# Patient Record
Sex: FEMALE | Race: WHITE | NOT HISPANIC OR LATINO
[De-identification: names, ages, dates, MRNs, and addresses within clinical notes are randomized per-mention and may not be internally consistent; named-entity substitution may affect disease eponyms.]

---

## 2017-05-02 ENCOUNTER — RX RENEWAL (OUTPATIENT)
Age: 67
End: 2017-05-02

## 2017-05-15 ENCOUNTER — APPOINTMENT (OUTPATIENT)
Dept: NEUROSURGERY | Facility: CLINIC | Age: 67
End: 2017-05-15

## 2017-05-15 VITALS
HEART RATE: 72 BPM | HEIGHT: 68 IN | WEIGHT: 130 LBS | DIASTOLIC BLOOD PRESSURE: 96 MMHG | SYSTOLIC BLOOD PRESSURE: 190 MMHG | BODY MASS INDEX: 19.7 KG/M2 | OXYGEN SATURATION: 98 %

## 2017-05-15 DIAGNOSIS — G50.0 TRIGEMINAL NEURALGIA: ICD-10-CM

## 2017-05-15 DIAGNOSIS — R41.3 OTHER AMNESIA: ICD-10-CM

## 2017-05-15 RX ORDER — CARBAMAZEPINE 200 MG/1
200 TABLET, EXTENDED RELEASE ORAL
Qty: 60 | Refills: 0 | Status: ACTIVE | COMMUNITY
Start: 2017-05-15

## 2017-05-15 RX ORDER — DONEPEZIL HYDROCHLORIDE 5 MG/1
5 TABLET ORAL DAILY
Qty: 30 | Refills: 0 | Status: ACTIVE | COMMUNITY
Start: 2017-05-15

## 2018-05-02 ENCOUNTER — RX RENEWAL (OUTPATIENT)
Age: 68
End: 2018-05-02

## 2018-05-20 ENCOUNTER — FORM ENCOUNTER (OUTPATIENT)
Age: 68
End: 2018-05-20

## 2018-05-21 ENCOUNTER — APPOINTMENT (OUTPATIENT)
Dept: NEUROSURGERY | Facility: CLINIC | Age: 68
End: 2018-05-21
Payer: MEDICARE

## 2018-05-21 ENCOUNTER — OUTPATIENT (OUTPATIENT)
Dept: OUTPATIENT SERVICES | Facility: HOSPITAL | Age: 68
LOS: 1 days | End: 2018-05-21
Payer: MEDICARE

## 2018-05-21 ENCOUNTER — APPOINTMENT (OUTPATIENT)
Dept: MRI IMAGING | Facility: HOSPITAL | Age: 68
End: 2018-05-21
Payer: MEDICARE

## 2018-05-21 VITALS
DIASTOLIC BLOOD PRESSURE: 85 MMHG | TEMPERATURE: 97.9 F | HEIGHT: 68 IN | OXYGEN SATURATION: 100 % | HEART RATE: 67 BPM | SYSTOLIC BLOOD PRESSURE: 168 MMHG | WEIGHT: 135 LBS | RESPIRATION RATE: 18 BRPM | BODY MASS INDEX: 20.46 KG/M2

## 2018-05-21 PROCEDURE — 99214 OFFICE O/P EST MOD 30 MIN: CPT

## 2018-05-21 PROCEDURE — A9585: CPT

## 2018-05-21 PROCEDURE — 70553 MRI BRAIN STEM W/O & W/DYE: CPT | Mod: 26

## 2018-05-21 PROCEDURE — 70553 MRI BRAIN STEM W/O & W/DYE: CPT

## 2019-03-04 PROBLEM — R41.3 MEMORY LOSS: Status: ACTIVE | Noted: 2017-05-15

## 2019-10-03 ENCOUNTER — RX RENEWAL (OUTPATIENT)
Age: 69
End: 2019-10-03

## 2019-10-08 RX ORDER — DIAZEPAM 2 MG/1
2 TABLET ORAL
Qty: 1 | Refills: 0 | Status: ACTIVE | COMMUNITY
Start: 2018-05-02 | End: 1900-01-01

## 2019-10-21 ENCOUNTER — APPOINTMENT (OUTPATIENT)
Dept: NEUROSURGERY | Facility: CLINIC | Age: 69
End: 2019-10-21

## 2019-11-10 ENCOUNTER — FORM ENCOUNTER (OUTPATIENT)
Age: 69
End: 2019-11-10

## 2019-11-11 ENCOUNTER — APPOINTMENT (OUTPATIENT)
Dept: NEUROSURGERY | Facility: CLINIC | Age: 69
End: 2019-11-11
Payer: MEDICARE

## 2019-11-11 ENCOUNTER — OUTPATIENT (OUTPATIENT)
Dept: OUTPATIENT SERVICES | Facility: HOSPITAL | Age: 69
LOS: 1 days | End: 2019-11-11
Payer: MEDICARE

## 2019-11-11 ENCOUNTER — APPOINTMENT (OUTPATIENT)
Dept: MRI IMAGING | Facility: HOSPITAL | Age: 69
End: 2019-11-11
Payer: MEDICARE

## 2019-11-11 VITALS
WEIGHT: 128 LBS | RESPIRATION RATE: 18 BRPM | SYSTOLIC BLOOD PRESSURE: 159 MMHG | HEART RATE: 80 BPM | TEMPERATURE: 98.4 F | DIASTOLIC BLOOD PRESSURE: 83 MMHG | HEIGHT: 68 IN | BODY MASS INDEX: 19.4 KG/M2 | OXYGEN SATURATION: 97 %

## 2019-11-11 PROCEDURE — 70551 MRI BRAIN STEM W/O DYE: CPT | Mod: 26

## 2019-11-11 PROCEDURE — 99214 OFFICE O/P EST MOD 30 MIN: CPT

## 2019-11-11 PROCEDURE — 70551 MRI BRAIN STEM W/O DYE: CPT

## 2019-11-11 RX ORDER — MEMANTINE HYDROCHLORIDE 10 MG/1
10 TABLET, FILM COATED ORAL TWICE DAILY
Qty: 60 | Refills: 0 | Status: ACTIVE | COMMUNITY
Start: 2019-11-11

## 2019-11-25 ENCOUNTER — TRANSCRIPTION ENCOUNTER (OUTPATIENT)
Age: 69
End: 2019-11-25

## 2019-12-06 NOTE — REASON FOR VISIT
[Follow-Up: _____] : a [unfilled] follow-up visit [FreeTextEntry1] : She has a history of memory problems, occasional headaches and dizziness. She had a MRI performed and was found to have a right posterior fossa mass and significant white matter changes. \par \par She presents today with a new MRI. She is accompanied by her  and daughter. He reports she has had two episodes of passing out, once at the beach and once at [employer]. They lasted for 20-30 seconds. \par \par

## 2019-12-06 NOTE — ASSESSMENT
[FreeTextEntry1] : The patient’s established problem of early onset dementia is progressive. Her repeat MRI shows no increase in size of right CP angle meningioma. My recommendation is serial imaging to follow the tumor. I had a long discussion with the patient about repeat MRI in 2 years in Fall 2021.The patient should return to clinic to see me at that time with the new MRI. \par

## 2019-12-06 NOTE — HISTORY OF PRESENT ILLNESS
[FreeTextEntry1] : [facility] which shows evidence of Cognitive deficits as per Obed Stewart. She presents today with a follow up MRI. She is being managed by Dr. Goldberg in neurology. She is taking tegretol 200mg bid, mamantine 5mg daily, aricept 10mg daily. \par \par She presents with her  to review recent imaging.